# Patient Record
Sex: FEMALE | Race: NATIVE HAWAIIAN OR OTHER PACIFIC ISLANDER | HISPANIC OR LATINO | URBAN - METROPOLITAN AREA
[De-identification: names, ages, dates, MRNs, and addresses within clinical notes are randomized per-mention and may not be internally consistent; named-entity substitution may affect disease eponyms.]

---

## 2018-08-25 ENCOUNTER — EMERGENCY (EMERGENCY)
Facility: HOSPITAL | Age: 28
LOS: 1 days | Discharge: HOME | End: 2018-08-25
Attending: EMERGENCY MEDICINE | Admitting: EMERGENCY MEDICINE
Payer: MEDICAID

## 2018-08-25 VITALS
WEIGHT: 143.96 LBS | SYSTOLIC BLOOD PRESSURE: 104 MMHG | RESPIRATION RATE: 18 BRPM | OXYGEN SATURATION: 100 % | DIASTOLIC BLOOD PRESSURE: 71 MMHG | TEMPERATURE: 98 F | HEART RATE: 84 BPM

## 2018-08-25 DIAGNOSIS — M54.5 LOW BACK PAIN: ICD-10-CM

## 2018-08-25 DIAGNOSIS — R10.2 PELVIC AND PERINEAL PAIN: ICD-10-CM

## 2018-08-25 DIAGNOSIS — O26.892 OTHER SPECIFIED PREGNANCY RELATED CONDITIONS, SECOND TRIMESTER: ICD-10-CM

## 2018-08-25 DIAGNOSIS — M79.662 PAIN IN LEFT LOWER LEG: ICD-10-CM

## 2018-08-25 DIAGNOSIS — Z3A.18 18 WEEKS GESTATION OF PREGNANCY: ICD-10-CM

## 2018-08-25 LAB
ALBUMIN SERPL ELPH-MCNC: 3.8 G/DL — SIGNIFICANT CHANGE UP (ref 3.3–5)
ALP SERPL-CCNC: 37 U/L — LOW (ref 40–120)
ALT FLD-CCNC: 11 U/L — SIGNIFICANT CHANGE UP (ref 10–45)
ANION GAP SERPL CALC-SCNC: 15 MMOL/L — SIGNIFICANT CHANGE UP (ref 5–17)
APPEARANCE UR: ABNORMAL
AST SERPL-CCNC: 15 U/L — SIGNIFICANT CHANGE UP (ref 10–40)
BACTERIA # UR AUTO: PRESENT /HPF
BASOPHILS NFR BLD AUTO: 0.3 % — SIGNIFICANT CHANGE UP (ref 0–2)
BILIRUB SERPL-MCNC: 0.2 MG/DL — SIGNIFICANT CHANGE UP (ref 0.2–1.2)
BILIRUB UR-MCNC: NEGATIVE — SIGNIFICANT CHANGE UP
BLD GP AB SCN SERPL QL: NEGATIVE — SIGNIFICANT CHANGE UP
BUN SERPL-MCNC: 9 MG/DL — SIGNIFICANT CHANGE UP (ref 7–23)
CALCIUM SERPL-MCNC: 9.2 MG/DL — SIGNIFICANT CHANGE UP (ref 8.4–10.5)
CHLORIDE SERPL-SCNC: 98 MMOL/L — SIGNIFICANT CHANGE UP (ref 96–108)
CO2 SERPL-SCNC: 21 MMOL/L — LOW (ref 22–31)
COLOR SPEC: YELLOW — SIGNIFICANT CHANGE UP
CREAT SERPL-MCNC: 0.55 MG/DL — SIGNIFICANT CHANGE UP (ref 0.5–1.3)
DIFF PNL FLD: NEGATIVE — SIGNIFICANT CHANGE UP
EOSINOPHIL NFR BLD AUTO: 0.7 % — SIGNIFICANT CHANGE UP (ref 0–6)
EPI CELLS # UR: ABNORMAL /HPF (ref 0–5)
GLUCOSE SERPL-MCNC: 89 MG/DL — SIGNIFICANT CHANGE UP (ref 70–99)
GLUCOSE UR QL: NEGATIVE — SIGNIFICANT CHANGE UP
HCG SERPL-ACNC: HIGH MIU/ML
HCT VFR BLD CALC: 35.8 % — SIGNIFICANT CHANGE UP (ref 34.5–45)
HGB BLD-MCNC: 12.7 G/DL — SIGNIFICANT CHANGE UP (ref 11.5–15.5)
KETONES UR-MCNC: NEGATIVE — SIGNIFICANT CHANGE UP
LEUKOCYTE ESTERASE UR-ACNC: ABNORMAL
LYMPHOCYTES # BLD AUTO: 14.7 % — SIGNIFICANT CHANGE UP (ref 13–44)
MCHC RBC-ENTMCNC: 29.1 PG — SIGNIFICANT CHANGE UP (ref 27–34)
MCHC RBC-ENTMCNC: 35.5 G/DL — SIGNIFICANT CHANGE UP (ref 32–36)
MCV RBC AUTO: 82.1 FL — SIGNIFICANT CHANGE UP (ref 80–100)
MONOCYTES NFR BLD AUTO: 6.2 % — SIGNIFICANT CHANGE UP (ref 2–14)
NEUTROPHILS NFR BLD AUTO: 78.1 % — HIGH (ref 43–77)
NITRITE UR-MCNC: NEGATIVE — SIGNIFICANT CHANGE UP
PH UR: 6 — SIGNIFICANT CHANGE UP (ref 5–8)
PLATELET # BLD AUTO: 271 K/UL — SIGNIFICANT CHANGE UP (ref 150–400)
POTASSIUM SERPL-MCNC: 3.9 MMOL/L — SIGNIFICANT CHANGE UP (ref 3.5–5.3)
POTASSIUM SERPL-SCNC: 3.9 MMOL/L — SIGNIFICANT CHANGE UP (ref 3.5–5.3)
PROT SERPL-MCNC: 7 G/DL — SIGNIFICANT CHANGE UP (ref 6–8.3)
PROT UR-MCNC: NEGATIVE MG/DL — SIGNIFICANT CHANGE UP
RBC # BLD: 4.36 M/UL — SIGNIFICANT CHANGE UP (ref 3.8–5.2)
RBC # FLD: 12.6 % — SIGNIFICANT CHANGE UP (ref 10.3–16.9)
RBC CASTS # UR COMP ASSIST: < 5 /HPF — SIGNIFICANT CHANGE UP
RH IG SCN BLD-IMP: POSITIVE — SIGNIFICANT CHANGE UP
SODIUM SERPL-SCNC: 134 MMOL/L — LOW (ref 135–145)
SP GR SPEC: >=1.03 — SIGNIFICANT CHANGE UP (ref 1–1.03)
UROBILINOGEN FLD QL: 0.2 E.U./DL — SIGNIFICANT CHANGE UP
WBC # BLD: 11.6 K/UL — HIGH (ref 3.8–10.5)
WBC # FLD AUTO: 11.6 K/UL — HIGH (ref 3.8–10.5)
WBC UR QL: ABNORMAL /HPF

## 2018-08-25 PROCEDURE — 86900 BLOOD TYPING SEROLOGIC ABO: CPT

## 2018-08-25 PROCEDURE — 99284 EMERGENCY DEPT VISIT MOD MDM: CPT

## 2018-08-25 PROCEDURE — 93971 EXTREMITY STUDY: CPT

## 2018-08-25 PROCEDURE — 85025 COMPLETE CBC W/AUTO DIFF WBC: CPT

## 2018-08-25 PROCEDURE — 80053 COMPREHEN METABOLIC PANEL: CPT

## 2018-08-25 PROCEDURE — 76801 OB US < 14 WKS SINGLE FETUS: CPT | Mod: 26

## 2018-08-25 PROCEDURE — 86850 RBC ANTIBODY SCREEN: CPT

## 2018-08-25 PROCEDURE — 93971 EXTREMITY STUDY: CPT | Mod: 26,LT

## 2018-08-25 PROCEDURE — 84702 CHORIONIC GONADOTROPIN TEST: CPT

## 2018-08-25 PROCEDURE — 76801 OB US < 14 WKS SINGLE FETUS: CPT

## 2018-08-25 PROCEDURE — 81001 URINALYSIS AUTO W/SCOPE: CPT

## 2018-08-25 PROCEDURE — 36415 COLL VENOUS BLD VENIPUNCTURE: CPT

## 2018-08-25 PROCEDURE — 86901 BLOOD TYPING SEROLOGIC RH(D): CPT

## 2018-08-25 NOTE — ED ADULT TRIAGE NOTE - ARRIVAL INFO ADDITIONAL COMMENTS
pt c/o llq pain radiating to leg that began yesterday. denies flank pain, nausea, vomiting, dysuria, hematuria, fevers, chills. admits to vaginal discharge, currently being treated for yeast infection reports 18 weeks pregnant c/o llq pain radiating to leg that began yesterday. denies flank pain, nausea, vomiting, dysuria, hematuria, fevers, chills. admits to vaginal discharge, currently being treated for yeast infection

## 2018-08-25 NOTE — CONSULT NOTE ADULT - SUBJECTIVE AND OBJECTIVE BOX
28y   at 18wk2d based off first trimester sonogram presenting to ED today with left inguinal pain and left leg discomfort. States that she has had the left inguinal pain for the last week that comes and goes in certain positions. States the left leg pain started today, reporting a dull ache down the entire anterior portion of leg. Denies any radiation, sharp pain. Denies any recent trauma or hx of trauma to leg. Able to walk and a move leg with no difficulty. Denies contractions/cramps, vaginal bleeding, abnormal discharge, pain on urination. Reports she has been constipated, every 3 days, but had a BM today with no relief of discomforts.   Pregnancy has been uncomplicated, followed by Dr. Littlejohn outpatient.   Denies fever, chills, chest pain, palpitations, SOB, n/v.     OB H/x:  G1- - VTOP D&C  G2-  SAB  G3-current    Gyn H/x:  Hx chronic BV and candida vaginosis      PAST MEDICAL & SURGICAL HISTORY:  Tonsillectomy at 7yo  Liposuction of abdomen/arms/back in [place] in        MEDICATIONS  (STANDING):  PNV   Topical clotrimazole       Allergies    No Known Allergies         Vital Signs Last 24 Hrs  T(C): 36.7 (25 Aug 2018 21:11), Max: 36.7 (25 Aug 2018 21:11)  T(F): 98.1 (25 Aug 2018 21:11), Max: 98.1 (25 Aug 2018 21:11)  HR: 84 (25 Aug 2018 21:11) (84 - 84)  BP: 104/71 (25 Aug 2018 21:11) (104/71 - 104/71)  BP(mean): --  RR: 18 (25 Aug 2018 21:11) (18 - 18)  SpO2: 100% (25 Aug 2018 21:11) (100% - 100%)    Physical Exam:  Gen: NAD  GI: soft, nontender, nondistended + BS, no rebound no guarding, fundus about 1cm below umbilicus, minimal tenderness on deep palpation of left inguinal region  BME: non tender,  no adnexal masses appreciated    Spec: cervix appears normal, thick white discharge in vault, no odor , no bleeding or fluid, cervix appears closed  Ext: no edema/erythema/tenderness, full ROM w/ and w/o resistance, no pain on deep palpation of entire leg    LABS:                        12.7   11.6  )-----------( 271      ( 25 Aug 2018 21:33 )             35.8         134<L>  |  98  |  9   ----------------------------<  89  3.9   |  21<L>  |  0.55    Ca    9.2      25 Aug 2018 21:33    TPro  7.0  /  Alb  3.8  /  TBili  0.2  /  DBili  x   /  AST  15  /  ALT  11  /  AlkPhos  37<L>        Urinalysis Basic - ( 25 Aug 2018 21:33 )    Color: Yellow / Appearance: SL Cloudy / SG: >=1.030 / pH: x  Gluc: x / Ketone: NEGATIVE  / Bili: Negative / Urobili: 0.2 E.U./dL   Blood: x / Protein: NEGATIVE mg/dL / Nitrite: NEGATIVE   Leuk Esterase: Small / RBC: < 5 /HPF / WBC 5-10 /HPF   Sq Epi: x / Non Sq Epi: Moderate /HPF / Bacteria: Present /HPF        RADIOLOGY & ADDITIONAL STUDIES:  < from: US Duplex Venous Lower Ext Ltd, Left (18 @ 23:02) >    EXAM:  US DPLX LWR EXT VEINS LTD LT                          PROCEDURE DATE:  2018          INTERPRETATION:    VENOUS DUPLEX DOPPLER OF THE LEFT LOWER EXTREMITY dated 2018 11:02 PM    INDICATION: left leg pain and swelling, r/o dvt    TECHNIQUE: Duplex Doppler evaluation including gray-scale ultrasound   imaging, color flow Doppler imaging, and Doppler spectral analysis of the   veins of the left lower extremity was performed.     COMPARISON: None    FINDINGS:    Thigh veins: The left common femoral, femoral, popliteal, proximal   greater saphenous, and proximal deep femoral veins are patent and free of   thrombus. The veins are normally compressible and have normal phasic flow   and augmentation response.    Calf veins: The paired peroneal and posterior tibial calf veins are   patent.    Contralateral CFV: The contralateral (right) common femoral vein is   patent and free of thrombus.      IMPRESSION:  No deep vein thrombosis seen.    < end of copied text >      < from: US Echo OB <=14 Weeks, First Gestation (18 @ 22:44) >    EXAM:  US OB LES THAN 14 WKS 1ST GEST                          PROCEDURE DATE:  2018          INTERPRETATION:  OBSTETRICAL ULTRASOUND - SECOND TRIMESTER dated   2018 10:44 PM    INDICATION: Second trimester pregnancy with left pelvic pain LMP:   3/17/2018    TECHNIQUE: Transabdominal views of the pelvis were obtained.    PRIOR STUDIES: None.    FINDINGS:   By dates, the estimated gestational age is 23 weeks 0 days.      A single intrauterine gestation is visible with an average ultrasound age   of 18 weeks 3 days.     Biparietal diameter is 4.1 cm, corresponding to a gestational age of 18   weeks 4 days.    Head circumference is 14.7 cm, corresponding to a gestational age of 18   weeks 0 days.    Abdominal circumference is 12.5 cm, corresponding to a gestational age of   18 weeks 1 day.    Femur length is 2.87 cm, corresponding to a gestational age of 18 weeks 4   days.    Fetal cardiac motion is visible with fetal heart rate of 152 beats per   minute.    The visualized fetalanatomy, including spinal column, four-chamber view   of the heart, stomach are demonstrated, and no abnormalities are   visualized.     The maternal right ovary could not be identified. The maternal left ovary   is normal in size, measuring 3.5 x 1.6 x 1.8 cm., volume of 9.7 mL No   left ovarian masses are seen. Venous flow to the left ovary was   identified. Arterial flow could not be identified.      IMPRESSION:   Single viable intrauterine gestation with average ultrasound age of 18   weeks 3days.    A normal sized left ovary with good venous flow was identified.  Arterial   flow could not be identified to the left ovary. Ovarian torsion is   unlikely but cannot be entirely excluded.         < end of copied text >

## 2018-08-25 NOTE — ED PROVIDER NOTE - OBJECTIVE STATEMENT
27 y/o female 18 wks pregnant c/o LLQ and LLE pain x 1 day. Pt states dull pain to LLQ and sharp pain to entire LLE. pain is constant. + low back pain. no trauma. no numbness, tingling or weakness. no n/v,. no vag bleeding. no urinary sx's. no further complaints.

## 2018-08-25 NOTE — ED PROVIDER NOTE - MUSCULOSKELETAL, MLM
Spine appears normal, range of motion is not limited, no muscle or joint tenderness. no back tenderness. no tenderness to LLE. no swelling. distal NVI

## 2018-08-25 NOTE — ED PROVIDER NOTE - ATTENDING CONTRIBUTION TO CARE
pt 18 weeks pregnant with llq pain x1 d and left leg pain for three days. no assoc f/c, n/v, still tolerating po. no right sided discomfort, no cp sob. NAD, RRR, CTAB, no abdominal tenderness, no focal neuro deficits, no calf tenderness. no swelling to ext. no cvat. eval w labs, US, gyn consulted. stable for gyn fu on dc.

## 2018-08-25 NOTE — ED ADULT NURSE NOTE - OBJECTIVE STATEMENT
pt received n [facility] a & O x 3 pt is about 18 weeks pregnant , pt  c/o LLQ pain since last night , pain radiates down left lower extremity , pt denies ant vaginal bleeding , no nausea or vomiting

## 2018-08-25 NOTE — CONSULT NOTE ADULT - ASSESSMENT
28y   at 18wk2d based off first trimester sonogram presenting to ED today with left inguinal pain and left leg discomfort.  - no signs of miscarriage given CL 3.8, cervix closed on exam and no abd cramps/ctx  - fetal surveillance shows appropriate FH and no signs of anatomy abnormalities, size consistent with dating  - left inguinal pain likely 2/2 round ligament pain, discussed round ligament pain with patient and exercises discussed to help with discomfort  - left leg pain unable to elicit on exam and patient walking without difficulty, sonogram of left leg showed no signs of DVT, discussed with patient to try PT outpatient and exercises stretching to help with discomfort as likely MSK   - sonogram shows no evidence of ovarian torsion or adnexal masses/abnormalities   - to follow up with Dr. Littlejohn  and sonogram   - immediately return to hospital if pain worsens  - vaginal yeast likely given findings on exam, vaginosis culture sent, f/u results, suggested patient continue with Topical clotrimazole in pregnancy  -plan d/w Dr. Smith

## 2018-08-25 NOTE — ED PROVIDER NOTE - MEDICAL DECISION MAKING DETAILS
LLQ pain and LLE pain and 18 wks pregnant. pt well appearing. non tender on exam. will check labs and u/s for pregnancy and doppler to r/o dvt. dispo pending results.

## 2018-08-25 NOTE — ED ADULT NURSE NOTE - NSIMPLEMENTINTERV_GEN_ALL_ED
Implemented All Universal Safety Interventions:  [place] to call system. Call bell, personal items and telephone within reach. Instruct patient to call for assistance. Room bathroom lighting operational. Non-slip footwear when patient is off stretcher. Physically safe environment: no spills, clutter or unnecessary equipment. Stretcher in lowest position, wheels locked, appropriate side rails in place.

## 2018-08-26 VITALS
RESPIRATION RATE: 18 BRPM | OXYGEN SATURATION: 100 % | HEART RATE: 81 BPM | TEMPERATURE: 98 F | DIASTOLIC BLOOD PRESSURE: 67 MMHG | SYSTOLIC BLOOD PRESSURE: 101 MMHG

## 2018-12-27 ENCOUNTER — OUTPATIENT (OUTPATIENT)
Dept: EMERGENCY DEPT | Facility: HOSPITAL | Age: 28
LOS: 1 days | Discharge: HOME | End: 2018-12-27
Payer: MEDICAID

## 2018-12-27 VITALS
RESPIRATION RATE: 16 BRPM | DIASTOLIC BLOOD PRESSURE: 77 MMHG | TEMPERATURE: 98 F | HEART RATE: 111 BPM | SYSTOLIC BLOOD PRESSURE: 116 MMHG | OXYGEN SATURATION: 97 %

## 2018-12-27 LAB
APPEARANCE UR: ABNORMAL
BILIRUB UR-MCNC: NEGATIVE — SIGNIFICANT CHANGE UP
COLOR SPEC: YELLOW — SIGNIFICANT CHANGE UP
DIFF PNL FLD: ABNORMAL
GLUCOSE UR QL: NEGATIVE — SIGNIFICANT CHANGE UP
KETONES UR-MCNC: 15 MG/DL
LEUKOCYTE ESTERASE UR-ACNC: ABNORMAL
NITRITE UR-MCNC: NEGATIVE — SIGNIFICANT CHANGE UP
PH UR: 5.5 — SIGNIFICANT CHANGE UP (ref 5–8)
PROT UR-MCNC: NEGATIVE MG/DL — SIGNIFICANT CHANGE UP
SP GR SPEC: <=1.005 — SIGNIFICANT CHANGE UP (ref 1–1.03)
UROBILINOGEN FLD QL: 0.2 E.U./DL — SIGNIFICANT CHANGE UP

## 2018-12-27 PROCEDURE — 99285 EMERGENCY DEPT VISIT HI MDM: CPT

## 2018-12-27 PROCEDURE — 87491 CHLMYD TRACH DNA AMP PROBE: CPT

## 2018-12-27 PROCEDURE — 87591 N.GONORRHOEAE DNA AMP PROB: CPT

## 2018-12-27 PROCEDURE — 87086 URINE CULTURE/COLONY COUNT: CPT

## 2018-12-27 PROCEDURE — 87798 DETECT AGENT NOS DNA AMP: CPT

## 2018-12-27 PROCEDURE — 87801 DETECT AGNT MULT DNA AMPLI: CPT

## 2018-12-27 PROCEDURE — 87563 M. GENITALIUM AMP PROBE: CPT

## 2018-12-27 PROCEDURE — 81001 URINALYSIS AUTO W/SCOPE: CPT

## 2018-12-27 RX ORDER — SODIUM CHLORIDE 9 MG/ML
1000 INJECTION, SOLUTION INTRAVENOUS ONCE
Qty: 0 | Refills: 0 | Status: DISCONTINUED | OUTPATIENT
Start: 2018-12-27 | End: 2018-12-27

## 2018-12-27 NOTE — ED ADULT TRIAGE NOTE - CHIEF COMPLAINT QUOTE
pt is 36 weeks pregnant c/o bilateral flank pain radiating to abdomen and pain her buttock. denies vag bleeding, cramping.  A0

## 2018-12-27 NOTE — ED PROVIDER NOTE - MEDICAL DECISION MAKING DETAILS
27 yo F,  (1 prior miscarriage), 36 weeks pregnant with no complications so far, p/w pain in back that started yesterday and crampy lower abdominal pain. Also reports increase in fetal movement since yesterday. Pt also with greenish/ yellow vag dc which is not new for her. No CP/SOB/ fevers/ chills/ dysuria/ urinary sxs. No unusual vag bleeding. Ob is Dr. Littlejohn. Case discussed with ob resident and pt admitted to L&D service for further evaluation.

## 2018-12-27 NOTE — ED PROVIDER NOTE - OBJECTIVE STATEMENT
27 yo F,  (1 prior miscarriage), 36 weeks pregnant with no complications so far p/w pain in back that started yesterday and crampy lower abd pain. Also reports increase in fetal movement since yesterday. Pt also with greenish/ yellow vag dc which is not new for her. No CP/SOB/ fevers/ chills/ dysuria/ urinary sxs. No unusual vag bleeding. Ob is Dr. Rdz. 27 yo F,  (1 prior miscarriage), 36 weeks pregnant with no complications so far, p/w pain in back that started yesterday and crampy lower abdominal pain. Also reports increase in fetal movement since yesterday. Pt also with greenish/ yellow vag dc which is not new for her. No CP/SOB/ fevers/ chills/ dysuria/ urinary sxs. No unusual vag bleeding. Ob is Dr. Rdz. 29 yo F,  (1 prior miscarriage), 36 weeks pregnant with no complications so far, p/w pain in back that started yesterday and crampy lower abdominal pain. Also reports increase in fetal movement since yesterday. Pt also with greenish/ yellow vag dc which is not new for her. No CP/SOB/ fevers/ chills/ dysuria/ urinary sxs. No unusual vag bleeding. Ob is Dr. Littlejohn.

## 2018-12-28 LAB
CULTURE RESULTS: NO GROWTH — SIGNIFICANT CHANGE UP
SPECIMEN SOURCE: SIGNIFICANT CHANGE UP

## 2019-01-09 LAB
A VAGINAE DNA VAG QL NAA+PROBE: SIGNIFICANT CHANGE UP
BVAB2 DNA VAG QL NAA+PROBE: SIGNIFICANT CHANGE UP
C ALBICANS DNA VAG QL NAA+PROBE: POSITIVE
C GLABRATA DNA VAG QL NAA+PROBE: NEGATIVE — SIGNIFICANT CHANGE UP
C KRUSEI DNA VAG QL NAA+PROBE: NEGATIVE — SIGNIFICANT CHANGE UP
C LUSITANIAE DNA VAG QL NAA+PROBE: NEGATIVE — SIGNIFICANT CHANGE UP
C PARAP DNA VAG QL NAA+PROBE: NEGATIVE — SIGNIFICANT CHANGE UP
C TRACH RRNA SPEC QL NAA+PROBE: NEGATIVE — SIGNIFICANT CHANGE UP
C TROPICLS DNA VAG QL NAA+PROBE: NEGATIVE — SIGNIFICANT CHANGE UP
MEGA1 DNA VAG QL NAA+PROBE: SIGNIFICANT CHANGE UP
N GONORRHOEA RRNA SPEC QL NAA+PROBE: NEGATIVE — SIGNIFICANT CHANGE UP
T VAGINALIS RRNA SPEC QL NAA+PROBE: NEGATIVE — SIGNIFICANT CHANGE UP

## 2019-01-24 ENCOUNTER — INPATIENT (INPATIENT)
Facility: HOSPITAL | Age: 29
LOS: 1 days | Discharge: HOME | End: 2019-01-26
Attending: OBSTETRICS & GYNECOLOGY | Admitting: OBSTETRICS & GYNECOLOGY
Payer: MEDICAID

## 2019-01-24 VITALS — HEIGHT: 60 IN | WEIGHT: 159.39 LBS

## 2019-01-24 DIAGNOSIS — Z3A.00 WEEKS OF GESTATION OF PREGNANCY NOT SPECIFIED: ICD-10-CM

## 2019-01-24 DIAGNOSIS — O26.899 OTHER SPECIFIED PREGNANCY RELATED CONDITIONS, UNSPECIFIED TRIMESTER: ICD-10-CM

## 2019-01-24 LAB
BASOPHILS NFR BLD AUTO: 0.3 % — SIGNIFICANT CHANGE UP (ref 0–2)
BLD GP AB SCN SERPL QL: NEGATIVE — SIGNIFICANT CHANGE UP
BLD GP AB SCN SERPL QL: NEGATIVE — SIGNIFICANT CHANGE UP
EOSINOPHIL NFR BLD AUTO: 0.5 % — SIGNIFICANT CHANGE UP (ref 0–6)
HCT VFR BLD CALC: 39.8 % — SIGNIFICANT CHANGE UP (ref 34.5–45)
HGB BLD-MCNC: 14.2 G/DL — SIGNIFICANT CHANGE UP (ref 11.5–15.5)
LYMPHOCYTES # BLD AUTO: 17.6 % — SIGNIFICANT CHANGE UP (ref 13–44)
MCHC RBC-ENTMCNC: 29.3 PG — SIGNIFICANT CHANGE UP (ref 27–34)
MCHC RBC-ENTMCNC: 35.7 G/DL — SIGNIFICANT CHANGE UP (ref 32–36)
MCV RBC AUTO: 82.2 FL — SIGNIFICANT CHANGE UP (ref 80–100)
MONOCYTES NFR BLD AUTO: 9.3 % — SIGNIFICANT CHANGE UP (ref 2–14)
NEUTROPHILS NFR BLD AUTO: 72.3 % — SIGNIFICANT CHANGE UP (ref 43–77)
PLATELET # BLD AUTO: 207 K/UL — SIGNIFICANT CHANGE UP (ref 150–400)
RBC # BLD: 4.84 M/UL — SIGNIFICANT CHANGE UP (ref 3.8–5.2)
RBC # FLD: 13.1 % — SIGNIFICANT CHANGE UP (ref 10.3–16.9)
RH IG SCN BLD-IMP: POSITIVE — SIGNIFICANT CHANGE UP
RH IG SCN BLD-IMP: POSITIVE — SIGNIFICANT CHANGE UP
WBC # BLD: 10.8 K/UL — HIGH (ref 3.8–10.5)
WBC # FLD AUTO: 10.8 K/UL — HIGH (ref 3.8–10.5)

## 2019-01-24 RX ORDER — ACETAMINOPHEN 500 MG
650 TABLET ORAL ONCE
Qty: 0 | Refills: 0 | Status: COMPLETED | OUTPATIENT
Start: 2019-01-24 | End: 2019-01-24

## 2019-01-24 RX ORDER — SODIUM CHLORIDE 9 MG/ML
3 INJECTION INTRAMUSCULAR; INTRAVENOUS; SUBCUTANEOUS EVERY 8 HOURS
Qty: 0 | Refills: 0 | Status: DISCONTINUED | OUTPATIENT
Start: 2019-01-24 | End: 2019-01-26

## 2019-01-24 RX ORDER — PRAMOXINE HYDROCHLORIDE 150 MG/15G
1 AEROSOL, FOAM RECTAL EVERY 4 HOURS
Qty: 0 | Refills: 0 | Status: DISCONTINUED | OUTPATIENT
Start: 2019-01-24 | End: 2019-01-26

## 2019-01-24 RX ORDER — OXYCODONE AND ACETAMINOPHEN 5; 325 MG/1; MG/1
2 TABLET ORAL EVERY 6 HOURS
Qty: 0 | Refills: 0 | Status: DISCONTINUED | OUTPATIENT
Start: 2019-01-24 | End: 2019-01-26

## 2019-01-24 RX ORDER — ACETAMINOPHEN 500 MG
650 TABLET ORAL EVERY 6 HOURS
Qty: 0 | Refills: 0 | Status: DISCONTINUED | OUTPATIENT
Start: 2019-01-24 | End: 2019-01-26

## 2019-01-24 RX ORDER — BENZOCAINE 10 %
1 GEL (GRAM) MUCOUS MEMBRANE EVERY 6 HOURS
Qty: 0 | Refills: 0 | Status: DISCONTINUED | OUTPATIENT
Start: 2019-01-24 | End: 2019-01-26

## 2019-01-24 RX ORDER — AMPICILLIN TRIHYDRATE 250 MG
2 CAPSULE ORAL EVERY 6 HOURS
Qty: 0 | Refills: 0 | Status: DISCONTINUED | OUTPATIENT
Start: 2019-01-24 | End: 2019-01-25

## 2019-01-24 RX ORDER — AER TRAVELER 0.5 G/1
1 SOLUTION RECTAL; TOPICAL EVERY 4 HOURS
Qty: 0 | Refills: 0 | Status: DISCONTINUED | OUTPATIENT
Start: 2019-01-24 | End: 2019-01-26

## 2019-01-24 RX ORDER — DIBUCAINE 1 %
1 OINTMENT (GRAM) RECTAL EVERY 4 HOURS
Qty: 0 | Refills: 0 | Status: DISCONTINUED | OUTPATIENT
Start: 2019-01-24 | End: 2019-01-26

## 2019-01-24 RX ORDER — FENTANYL/BUPIVACAINE/NS/PF 2MCG/ML-.1
250 PLASTIC BAG, INJECTION (ML) INJECTION
Qty: 0 | Refills: 0 | Status: DISCONTINUED | OUTPATIENT
Start: 2019-01-24 | End: 2019-01-26

## 2019-01-24 RX ORDER — SIMETHICONE 80 MG/1
80 TABLET, CHEWABLE ORAL EVERY 6 HOURS
Qty: 0 | Refills: 0 | Status: DISCONTINUED | OUTPATIENT
Start: 2019-01-24 | End: 2019-01-26

## 2019-01-24 RX ORDER — SODIUM CHLORIDE 9 MG/ML
100 INJECTION, SOLUTION INTRAVENOUS ONCE
Qty: 0 | Refills: 0 | Status: DISCONTINUED | OUTPATIENT
Start: 2019-01-24 | End: 2019-01-24

## 2019-01-24 RX ORDER — TETANUS TOXOID, REDUCED DIPHTHERIA TOXOID AND ACELLULAR PERTUSSIS VACCINE, ADSORBED 5; 2.5; 8; 8; 2.5 [IU]/.5ML; [IU]/.5ML; UG/.5ML; UG/.5ML; UG/.5ML
0.5 SUSPENSION INTRAMUSCULAR ONCE
Qty: 0 | Refills: 0 | Status: DISCONTINUED | OUTPATIENT
Start: 2019-01-24 | End: 2019-01-26

## 2019-01-24 RX ORDER — MAGNESIUM HYDROXIDE 400 MG/1
30 TABLET, CHEWABLE ORAL
Qty: 0 | Refills: 0 | Status: DISCONTINUED | OUTPATIENT
Start: 2019-01-24 | End: 2019-01-26

## 2019-01-24 RX ORDER — AMPICILLIN TRIHYDRATE 250 MG
CAPSULE ORAL
Qty: 0 | Refills: 0 | Status: DISCONTINUED | OUTPATIENT
Start: 2019-01-24 | End: 2019-01-25

## 2019-01-24 RX ORDER — OXYTOCIN 10 UNIT/ML
333.33 VIAL (ML) INJECTION
Qty: 20 | Refills: 0 | Status: DISCONTINUED | OUTPATIENT
Start: 2019-01-24 | End: 2019-01-24

## 2019-01-24 RX ORDER — CITRIC ACID/SODIUM CITRATE 300-500 MG
15 SOLUTION, ORAL ORAL ONCE
Qty: 0 | Refills: 0 | Status: DISCONTINUED | OUTPATIENT
Start: 2019-01-24 | End: 2019-01-24

## 2019-01-24 RX ORDER — OXYTOCIN 10 UNIT/ML
41.67 VIAL (ML) INJECTION
Qty: 20 | Refills: 0 | Status: DISCONTINUED | OUTPATIENT
Start: 2019-01-24 | End: 2019-01-26

## 2019-01-24 RX ORDER — OXYCODONE AND ACETAMINOPHEN 5; 325 MG/1; MG/1
1 TABLET ORAL
Qty: 0 | Refills: 0 | Status: DISCONTINUED | OUTPATIENT
Start: 2019-01-24 | End: 2019-01-26

## 2019-01-24 RX ORDER — AMPICILLIN TRIHYDRATE 250 MG
2 CAPSULE ORAL ONCE
Qty: 0 | Refills: 0 | Status: COMPLETED | OUTPATIENT
Start: 2019-01-24 | End: 2019-01-24

## 2019-01-24 RX ORDER — PENICILLIN G POTASSIUM 5000000 [IU]/1
2.5 POWDER, FOR SOLUTION INTRAMUSCULAR; INTRAPLEURAL; INTRATHECAL; INTRAVENOUS EVERY 4 HOURS
Qty: 0 | Refills: 0 | Status: DISCONTINUED | OUTPATIENT
Start: 2019-01-24 | End: 2019-01-24

## 2019-01-24 RX ORDER — GENTAMICIN SULFATE 40 MG/ML
300 VIAL (ML) INJECTION ONCE
Qty: 0 | Refills: 0 | Status: COMPLETED | OUTPATIENT
Start: 2019-01-24 | End: 2019-01-24

## 2019-01-24 RX ORDER — HYDROCORTISONE 1 %
1 OINTMENT (GRAM) TOPICAL EVERY 4 HOURS
Qty: 0 | Refills: 0 | Status: DISCONTINUED | OUTPATIENT
Start: 2019-01-24 | End: 2019-01-26

## 2019-01-24 RX ORDER — SODIUM CHLORIDE 9 MG/ML
1000 INJECTION, SOLUTION INTRAVENOUS
Qty: 0 | Refills: 0 | Status: DISCONTINUED | OUTPATIENT
Start: 2019-01-24 | End: 2019-01-24

## 2019-01-24 RX ORDER — IBUPROFEN 200 MG
600 TABLET ORAL EVERY 6 HOURS
Qty: 0 | Refills: 0 | Status: DISCONTINUED | OUTPATIENT
Start: 2019-01-24 | End: 2019-01-26

## 2019-01-24 RX ORDER — PENICILLIN G POTASSIUM 5000000 [IU]/1
5 POWDER, FOR SOLUTION INTRAMUSCULAR; INTRAPLEURAL; INTRATHECAL; INTRAVENOUS ONCE
Qty: 0 | Refills: 0 | Status: COMPLETED | OUTPATIENT
Start: 2019-01-24 | End: 2019-01-24

## 2019-01-24 RX ORDER — GLYCERIN ADULT
1 SUPPOSITORY, RECTAL RECTAL AT BEDTIME
Qty: 0 | Refills: 0 | Status: DISCONTINUED | OUTPATIENT
Start: 2019-01-24 | End: 2019-01-26

## 2019-01-24 RX ORDER — LANOLIN
1 OINTMENT (GRAM) TOPICAL EVERY 6 HOURS
Qty: 0 | Refills: 0 | Status: DISCONTINUED | OUTPATIENT
Start: 2019-01-24 | End: 2019-01-26

## 2019-01-24 RX ORDER — DOCUSATE SODIUM 100 MG
100 CAPSULE ORAL
Qty: 0 | Refills: 0 | Status: DISCONTINUED | OUTPATIENT
Start: 2019-01-24 | End: 2019-01-26

## 2019-01-24 RX ORDER — DIPHENHYDRAMINE HCL 50 MG
25 CAPSULE ORAL EVERY 6 HOURS
Qty: 0 | Refills: 0 | Status: DISCONTINUED | OUTPATIENT
Start: 2019-01-24 | End: 2019-01-26

## 2019-01-24 RX ORDER — OXYTOCIN 10 UNIT/ML
2 VIAL (ML) INJECTION
Qty: 30 | Refills: 0 | Status: DISCONTINUED | OUTPATIENT
Start: 2019-01-24 | End: 2019-01-26

## 2019-01-24 RX ORDER — PENICILLIN G POTASSIUM 5000000 [IU]/1
POWDER, FOR SOLUTION INTRAMUSCULAR; INTRAPLEURAL; INTRATHECAL; INTRAVENOUS
Qty: 0 | Refills: 0 | Status: DISCONTINUED | OUTPATIENT
Start: 2019-01-24 | End: 2019-01-24

## 2019-01-24 RX ADMIN — SODIUM CHLORIDE 125 MILLILITER(S): 9 INJECTION, SOLUTION INTRAVENOUS at 03:32

## 2019-01-24 RX ADMIN — Medication 650 MILLIGRAM(S): at 13:50

## 2019-01-24 RX ADMIN — Medication 216 GRAM(S): at 16:15

## 2019-01-24 RX ADMIN — Medication 600 MILLIGRAM(S): at 16:17

## 2019-01-24 RX ADMIN — Medication 216 GRAM(S): at 22:24

## 2019-01-24 RX ADMIN — Medication 650 MILLIGRAM(S): at 13:38

## 2019-01-24 RX ADMIN — PENICILLIN G POTASSIUM 100 MILLION UNIT(S): 5000000 POWDER, FOR SOLUTION INTRAMUSCULAR; INTRAPLEURAL; INTRATHECAL; INTRAVENOUS at 11:04

## 2019-01-24 RX ADMIN — SODIUM CHLORIDE 125 MILLILITER(S): 9 INJECTION, SOLUTION INTRAVENOUS at 04:15

## 2019-01-24 RX ADMIN — Medication 600 MILLIGRAM(S): at 23:20

## 2019-01-24 RX ADMIN — PENICILLIN G POTASSIUM 100 MILLION UNIT(S): 5000000 POWDER, FOR SOLUTION INTRAMUSCULAR; INTRAPLEURAL; INTRATHECAL; INTRAVENOUS at 02:55

## 2019-01-24 RX ADMIN — Medication 200 MILLIGRAM(S): at 16:58

## 2019-01-24 RX ADMIN — Medication 2 MILLIUNIT(S)/MIN: at 10:36

## 2019-01-24 RX ADMIN — SODIUM CHLORIDE 3 MILLILITER(S): 9 INJECTION INTRAMUSCULAR; INTRAVENOUS; SUBCUTANEOUS at 22:23

## 2019-01-24 RX ADMIN — Medication 600 MILLIGRAM(S): at 17:01

## 2019-01-24 RX ADMIN — PENICILLIN G POTASSIUM 100 MILLION UNIT(S): 5000000 POWDER, FOR SOLUTION INTRAMUSCULAR; INTRAPLEURAL; INTRATHECAL; INTRAVENOUS at 07:13

## 2019-01-24 RX ADMIN — Medication 600 MILLIGRAM(S): at 22:24

## 2019-01-25 RX ADMIN — Medication 600 MILLIGRAM(S): at 19:10

## 2019-01-25 RX ADMIN — Medication 100 MILLIGRAM(S): at 12:51

## 2019-01-25 RX ADMIN — Medication 600 MILLIGRAM(S): at 04:50

## 2019-01-25 RX ADMIN — Medication 1 APPLICATION(S): at 12:48

## 2019-01-25 RX ADMIN — Medication 600 MILLIGRAM(S): at 11:40

## 2019-01-25 RX ADMIN — Medication 1 TABLET(S): at 12:49

## 2019-01-25 RX ADMIN — SODIUM CHLORIDE 3 MILLILITER(S): 9 INJECTION INTRAMUSCULAR; INTRAVENOUS; SUBCUTANEOUS at 14:47

## 2019-01-25 RX ADMIN — Medication 216 GRAM(S): at 10:49

## 2019-01-25 RX ADMIN — SODIUM CHLORIDE 3 MILLILITER(S): 9 INJECTION INTRAMUSCULAR; INTRAVENOUS; SUBCUTANEOUS at 06:16

## 2019-01-25 RX ADMIN — SODIUM CHLORIDE 3 MILLILITER(S): 9 INJECTION INTRAMUSCULAR; INTRAVENOUS; SUBCUTANEOUS at 22:25

## 2019-01-25 RX ADMIN — Medication 600 MILLIGRAM(S): at 18:27

## 2019-01-25 RX ADMIN — Medication 600 MILLIGRAM(S): at 10:58

## 2019-01-25 RX ADMIN — Medication 1 SPRAY(S): at 12:48

## 2019-01-25 RX ADMIN — Medication 600 MILLIGRAM(S): at 04:10

## 2019-01-25 RX ADMIN — Medication 216 GRAM(S): at 04:10

## 2019-01-25 NOTE — PROGRESS NOTE ADULT - SUBJECTIVE AND OBJECTIVE BOX
Patient evaluated at bedside.   She reports pain is well controlled.    She has been ambulating without assistance, voiding spontaneously, and is breastfeeding.    She denies HA, dizziness, chest pain, palpitations, shortness of breathe, n/v, heavy vaginal bleeding or perineal discomfort.    Physical Exam:  Vital Signs Last 24 Hrs  T(C): 36.4 (25 Jan 2019 00:30), Max: 37.6 (24 Jan 2019 16:00)  T(F): 97.6 (25 Jan 2019 00:30), Max: 99.7 (24 Jan 2019 16:00)  HR: 66 (25 Jan 2019 00:30) (66 - 94)  BP: 105/71 (25 Jan 2019 00:30) (93/63 - 115/68)  BP(mean): --  RR: 18 (25 Jan 2019 00:30) (18 - 18)  SpO2: 97% (25 Jan 2019 00:30) (97% - 100%)    GA: NAD, A+0 x 3  Abd: + BS, soft, nontender, nondistended, no rebound or guarding, uterus firm at midline  : lochia WNL  Extremities: no swelling or calf tenderness                          14.2   10.8  )-----------( 207      ( 24 Jan 2019 02:39 )             39.8         MEDICATIONS  (STANDING):  ampicillin  IVPB      ampicillin  IVPB 2 Gram(s) IV Intermittent every 6 hours  diphtheria/tetanus/pertussis (acellular) Vaccine (ADAcel) 0.5 milliLiter(s) IntraMuscular once  fentaNYL (2 MICROgram(s)/mL) + BUpivacaine 0.1% in 0.9% Sodium Chloride PCEA 250 milliLiter(s) Epidural PCA Continuous  ibuprofen  Tablet. 600 milliGRAM(s) Oral every 6 hours  oxytocin Infusion 41.667 milliUNIT(s)/Min (125 mL/Hr) IV Continuous <Continuous>  oxytocin Infusion 2 milliUNIT(s)/Min (2 mL/Hr) IV Continuous <Continuous>  prenatal multivitamin 1 Tablet(s) Oral daily  sodium chloride 0.9% lock flush 3 milliLiter(s) IV Push every 8 hours    MEDICATIONS  (PRN):  acetaminophen   Tablet .. 650 milliGRAM(s) Oral every 6 hours PRN Temp greater or equal to 38.5C (101.3F), Mild Pain (1 - 3)  benzocaine 20%/menthol 0.5% Spray 1 Spray(s) Topical every 6 hours PRN moderate pain  dibucaine 1% Ointment 1 Application(s) Topical every 4 hours PRN Perineal Discomfort  diphenhydrAMINE 25 milliGRAM(s) Oral every 6 hours PRN Itching  docusate sodium 100 milliGRAM(s) Oral two times a day PRN Stool Softening  glycerin Suppository - Adult 1 Suppository(s) Rectal at bedtime PRN Constipation  hydrocortisone 1% Cream 1 Application(s) Topical every 4 hours PRN mild pain  lanolin Ointment 1 Application(s) Topical every 6 hours PRN Sore Nipples  magnesium hydroxide Suspension 30 milliLiter(s) Oral two times a day PRN Constipation  oxyCODONE    5 mG/acetaminophen 325 mG 1 Tablet(s) Oral every 3 hours PRN Moderate Pain (4 - 6)  oxyCODONE    5 mG/acetaminophen 325 mG 2 Tablet(s) Oral every 6 hours PRN Severe Pain (7 - 10)  pramoxine 1%/zinc 5% Cream 1 Application(s) Topical every 4 hours PRN severe pain  simethicone 80 milliGRAM(s) Chew every 6 hours PRN Gas  witch hazel Pads 1 Application(s) Topical every 4 hours PRN Perineal Discomfort

## 2019-01-25 NOTE — PROGRESS NOTE ADULT - ASSESSMENT
A/P yo 28y  s/p , PP#1, stable  Suspected triple I: tmax/last 100.8 on  @14:00; afebrile, continue IV A/G x24hrs, d/c today  @14:30  1. Pain: OPM  2. GI: Reg  3. :  Voiding  4. DVT prophylaxis: SCDs, ambulation  5. Dispo: PP#2

## 2019-01-25 NOTE — LACTATION INITIAL EVALUATION - NS LACT CON REASON FOR REQ
primaparous mom Pt is a P1 at 40. 0 wks. gestation via vaginal delivery.   Pt.  states hx of PCOS did not take medication during the pregnancy or prior to the pregnancy.   Pt. states baby was admitted to [facility]  due to maternal temp. /r/o sepsis.  Pt. states while in [facility] baby received formula and when she attempted to latch baby to breast. Baby did not latch..  Pt. has been pumping and supplementing with EBM and formula.   Baby fussy when LC arrived.  Pt. willing to attempt to latch baby many family members present at bedside.  Colostrum easily expressed  and colostrum expressed to nipple, baby attempted to latch but became fussy and irritable.  INformed baby to feed baby collected colostrum. Discussed with pt. supplemental amounts of colostrum /formula baby should be receiving at this time.  Provided pt. written guideline for supplemental amounts and discussed  with pt. supplemental amount changes as baby becomes older.  Pt. understands baby will receive 15 ml of colostrum or formula until tomorrow afternoon.  Pt. is able to collect 15 ml. of colostrum via pumping and understands baby does not need additional formula.  Discussed with pt. if she is unable to collect 15 ml of colostrum she will make up the difference with formula.  Discussed with pt. use of pacifier and its implications such as nipple confusion , hiding feeding cues etc.  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  Discussed with pt. how tongue tie can effect breastfeeding.  Provided written information to pt. regarding tongue tie.  Informed pt. to follow up with private pediatrician regarding tongue tie.  Pt.  states she understands if she is able to collect colostrum to meet supplemental needs she does not need to give additional formula.  Pt. also understands if she is not collecting enough she will make up the difference with formula.  Reviewed with pt. she is to observe for feeding cues, encourage skin to skin, attempt to breastfeed every 2-3 hrs.  and to monitor voids and stools./primaparous mom Pt is a P1 at 40. 0 wks. gestation via vaginal delivery.   Pt.  states hx of PCOS did not take medication during the pregnancy or prior to the pregnancy.   Pt. states baby was admitted to [facility]  due to maternal temp. /r/o sepsis.  Pt. states while in [facility] baby received formula and when she attempted to latch baby to breast. Baby did not latch.  Pt. has been pumping and supplementing with EBM and formula.   Baby fussy when LC arrived.  Pt. willing to attempt to latch baby many family members present at bedside.  Colostrum easily expressed  and colostrum expressed to nipple, baby attempted to latch but became fussy and irritable.  Informed pt. to feed baby collected colostrum. Discussed with pt. supplemental amounts of colostrum /formula baby should be receiving at this time.  Provided pt. written guideline for supplemental amounts and discussed  with pt. supplemental amount changes as baby becomes older.  Pt. understands baby will receive 15 ml of colostrum or formula until tomorrow afternoon.  Pt. is able to collect 15 ml. of colostrum via pumping and understands baby does not need additional formula.  Discussed with pt. if she is unable to collect 15 ml of colostrum she will make up the difference with formula.  Discussed with pt. use of pacifier and its implications such as nipple confusion , hiding feeding cues etc.  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  Discussed with pt. how tongue tie can effect breastfeeding.  Provided written information to pt. regarding tongue tie.  Informed pt. to follow up with private pediatrician regarding tongue tie.  Pt.  states she understands if she is able to collect colostrum to meet supplemental needs she does not need to give additional formula.  Pt. also understands if she is not collecting enough she will make up the difference with formula.  Reviewed with pt. she is to observe for feeding cues, encourage skin to skin, attempt to breastfeed every 2-3 hrs.  and to monitor voids and stools./primaparous mom

## 2019-01-26 VITALS
TEMPERATURE: 97 F | OXYGEN SATURATION: 99 % | DIASTOLIC BLOOD PRESSURE: 72 MMHG | SYSTOLIC BLOOD PRESSURE: 123 MMHG | HEART RATE: 84 BPM | RESPIRATION RATE: 18 BRPM

## 2019-01-26 LAB — T PALLIDUM AB TITR SER: NEGATIVE — SIGNIFICANT CHANGE UP

## 2019-01-26 PROCEDURE — 85025 COMPLETE CBC W/AUTO DIFF WBC: CPT

## 2019-01-26 PROCEDURE — 86850 RBC ANTIBODY SCREEN: CPT

## 2019-01-26 PROCEDURE — 86900 BLOOD TYPING SEROLOGIC ABO: CPT

## 2019-01-26 PROCEDURE — 99214 OFFICE O/P EST MOD 30 MIN: CPT

## 2019-01-26 PROCEDURE — 86901 BLOOD TYPING SEROLOGIC RH(D): CPT

## 2019-01-26 PROCEDURE — 86780 TREPONEMA PALLIDUM: CPT

## 2019-01-26 PROCEDURE — 88307 TISSUE EXAM BY PATHOLOGIST: CPT

## 2019-01-26 PROCEDURE — 36415 COLL VENOUS BLD VENIPUNCTURE: CPT

## 2019-01-26 RX ADMIN — SODIUM CHLORIDE 3 MILLILITER(S): 9 INJECTION INTRAMUSCULAR; INTRAVENOUS; SUBCUTANEOUS at 06:28

## 2019-01-26 RX ADMIN — Medication 600 MILLIGRAM(S): at 14:05

## 2019-01-26 RX ADMIN — Medication 100 MILLIGRAM(S): at 12:21

## 2019-01-26 RX ADMIN — Medication 1 TABLET(S): at 12:21

## 2019-01-26 RX ADMIN — Medication 600 MILLIGRAM(S): at 00:59

## 2019-01-26 RX ADMIN — Medication 600 MILLIGRAM(S): at 07:04

## 2019-01-26 RX ADMIN — Medication 100 MILLIGRAM(S): at 00:59

## 2019-01-26 RX ADMIN — Medication 600 MILLIGRAM(S): at 01:49

## 2019-01-26 RX ADMIN — Medication 600 MILLIGRAM(S): at 07:51

## 2019-01-26 NOTE — PROGRESS NOTE ADULT - ASSESSMENT
A/P yo 28y  s/p , PP#2 , stable  1. suspected triple I  -s/p A/G  -afebrile  2. Pain: OPM  3. GI: Reg  4. :  Voiding  5. DVT prophylaxis: SCDs, ambulation  6. Dispo: PP#2

## 2019-01-26 NOTE — PROGRESS NOTE ADULT - SUBJECTIVE AND OBJECTIVE BOX
Patient evaluated at bedside.     She reports pain is well controlled.    She has been ambulating without assistance, voiding spontaneously, and is breastfeeding.    She denies HA, dizziness, chest pain, palpitations, shortness of breath, n/v, heavy vaginal bleeding or perineal discomfort.    Physical Exam:  Vital Signs Last 24 Hrs  T(C): 36.7 (26 Jan 2019 06:58), Max: 36.7 (26 Jan 2019 06:58)  T(F): 98 (26 Jan 2019 06:58), Max: 98 (26 Jan 2019 06:58)  HR: 85 (26 Jan 2019 06:58) (75 - 85)  BP: 109/75 (26 Jan 2019 06:58) (109/74 - 120/80)  BP(mean): --  RR: 16 (26 Jan 2019 06:58) (16 - 18)  SpO2: 98% (26 Jan 2019 06:58) (97% - 99%)    GA: NAD, A+0 x 3  Abd: + BS, soft, nontender, nondistended, no rebound or guarding, uterus firm at midline  : lochia WNL  Extremities: no edema or calf tenderness

## 2019-01-26 NOTE — DISCHARGE NOTE OB - HOSPITAL COURSE
uncomplicated postpartum course, completed 24 hr iv antibiotics for suspected chorio, has remained afebrile and hemodynamically stable

## 2019-01-26 NOTE — DISCHARGE NOTE OB - PATIENT PORTAL LINK FT
You can access the [employer] [facility] Patient Portal, offered by [facility], by registering with the following website: [email or web address]

## 2019-01-26 NOTE — DISCHARGE NOTE OB - CARE PROVIDER_API CALL
Connie Littlejohn), Gynecologic Oncology  [address] [place] [address]  Phone: [phone]  Fax: [phone]

## 2019-01-26 NOTE — DISCHARGE NOTE OB - CARE PLAN
Principal Discharge DX:	Postpartum state  Goal:	safe discharge to home with instructions  Assessment and plan of treatment:	see below

## 2019-01-28 DIAGNOSIS — Z34.93 ENCOUNTER FOR SUPERVISION OF NORMAL PREGNANCY, UNSPECIFIED, THIRD TRIMESTER: ICD-10-CM

## 2019-01-28 DIAGNOSIS — Z3A.40 40 WEEKS GESTATION OF PREGNANCY: ICD-10-CM

## 2019-01-28 DIAGNOSIS — O41.1230 CHORIOAMNIONITIS, THIRD TRIMESTER, NOT APPLICABLE OR UNSPECIFIED: ICD-10-CM

## 2019-01-28 LAB — SURGICAL PATHOLOGY STUDY: SIGNIFICANT CHANGE UP

## 2019-02-25 DIAGNOSIS — Z3A.00 WEEKS OF GESTATION OF PREGNANCY NOT SPECIFIED: ICD-10-CM

## 2019-02-25 DIAGNOSIS — O26.899 OTHER SPECIFIED PREGNANCY RELATED CONDITIONS, UNSPECIFIED TRIMESTER: ICD-10-CM

## 2024-03-12 NOTE — DISCHARGE NOTE OB - IF YOU ARE A SMOKER, IT IS IMPORTANT FOR YOUR HEALTH TO STOP SMOKING. PLEASE BE AWARE THAT SECOND HAND SMOKE IS ALSO HARMFUL.
Pt s/p MFM US and consultation.  MFM report reviewed by Jeannette Cheung MD.   Rec pp urology fu for proteinuria (tickle so we remember to place pp) Statement Selected